# Patient Record
Sex: MALE | Race: WHITE
[De-identification: names, ages, dates, MRNs, and addresses within clinical notes are randomized per-mention and may not be internally consistent; named-entity substitution may affect disease eponyms.]

---

## 2017-01-05 ENCOUNTER — HOSPITAL ENCOUNTER (OUTPATIENT)
Dept: HOSPITAL 45 - C.CTS | Age: 62
Discharge: HOME | End: 2017-01-05
Attending: INTERNAL MEDICINE
Payer: COMMERCIAL

## 2017-01-05 DIAGNOSIS — R06.09: ICD-10-CM

## 2017-01-05 DIAGNOSIS — R91.1: ICD-10-CM

## 2017-01-05 DIAGNOSIS — R91.8: ICD-10-CM

## 2017-01-05 DIAGNOSIS — J45.909: Primary | ICD-10-CM

## 2017-01-05 DIAGNOSIS — R06.02: ICD-10-CM

## 2017-01-05 NOTE — DIAGNOSTIC IMAGING REPORT
CHEST CT WITHOUT CONTRAST



CT DOSE: 513.18 mGy.cm



HISTORY: Lung nodule  J45.909 CnkikxG39.09 Dyspnea on [id number] Lung nodule,

multi



TECHNIQUE: Multiaxial CT images of the chest were performed without contrast.



COMPARISON: 7/20/2016



FINDINGS: Improved exam. The potential polypoid lesion within the trachea is no

longer present and presumably represented mucus on the prior exam. No

significant mediastinal or hilar adenopathy.



Lungs remain clear. Micronodularity right upper lung is unchanged. There are no

new interval or progressive nodules.



IMPRESSION: 



1. Stable to improved exam.





2. Unchanging micronodule right upper lung.





3. Study is otherwise negative.





4. The potential polypoid lesion on the prior study is not present currently and

most likely represented retained mucus on the prior study 







Electronically signed by:  Mahendra Hercules M.D.

1/5/2017 10:39 AM

## 2017-04-04 ENCOUNTER — HOSPITAL ENCOUNTER (OUTPATIENT)
Dept: HOSPITAL 45 - C.LAB1850 | Age: 62
Discharge: HOME | End: 2017-04-04
Attending: INTERNAL MEDICINE
Payer: COMMERCIAL

## 2017-04-04 DIAGNOSIS — D64.9: ICD-10-CM

## 2017-04-04 DIAGNOSIS — N40.1: ICD-10-CM

## 2017-04-04 DIAGNOSIS — E78.5: Primary | ICD-10-CM

## 2017-04-04 DIAGNOSIS — I10: ICD-10-CM

## 2017-04-04 LAB
ALT SERPL-CCNC: 26 U/L (ref 12–78)
ANION GAP SERPL CALC-SCNC: 6 MMOL/L (ref 3–11)
AST SERPL-CCNC: 23 U/L (ref 15–37)
BASOPHILS # BLD: 0.05 K/UL (ref 0–0.2)
BASOPHILS NFR BLD: 1 %
BUN SERPL-MCNC: 15 MG/DL (ref 7–18)
BUN/CREAT SERPL: 17 (ref 10–20)
CALCIUM SERPL-MCNC: 9.1 MG/DL (ref 8.5–10.1)
CHLORIDE SERPL-SCNC: 105 MMOL/L (ref 98–107)
CHOLEST/HDLC SERPL: 2.8 {RATIO}
CO2 SERPL-SCNC: 29 MMOL/L (ref 21–32)
COMPLETE: YES
CREAT SERPL-MCNC: 0.89 MG/DL (ref 0.6–1.4)
EOSINOPHIL NFR BLD AUTO: 251 K/UL (ref 130–400)
GLUCOSE SERPL-MCNC: 94 MG/DL (ref 70–99)
GLUCOSE UR QL: 63 MG/DL
HCT VFR BLD CALC: 40.2 % (ref 42–52)
IG%: 0.2 %
IMM GRANULOCYTES NFR BLD AUTO: 27.4 %
KETONES UR QL STRIP: 99 MG/DL
LYMPHOCYTES # BLD: 1.37 K/UL (ref 1.2–3.4)
MCH RBC QN AUTO: 28.9 PG (ref 25–34)
MCHC RBC AUTO-ENTMCNC: 32.6 G/DL (ref 32–36)
MCV RBC AUTO: 88.5 FL (ref 80–100)
MONOCYTES NFR BLD: 7 %
NEUTROPHILS # BLD AUTO: 1.8 %
NEUTROPHILS NFR BLD AUTO: 62.6 %
NITRITE UR QL STRIP: 75 MG/DL (ref 0–150)
PH UR: 177 MG/DL (ref 0–200)
PMV BLD AUTO: 10.3 FL (ref 7.4–10.4)
POTASSIUM SERPL-SCNC: 4 MMOL/L (ref 3.5–5.1)
PSA SERPL-MCNC: 2.09 NG/ML (ref 0–4)
RBC # BLD AUTO: 4.54 M/UL (ref 4.7–6.1)
SODIUM SERPL-SCNC: 140 MMOL/L (ref 136–145)
VERY LOW DENSITY LIPOPROT CALC: 15 MG/DL
WBC # BLD AUTO: 5 K/UL (ref 4.8–10.8)

## 2017-10-10 ENCOUNTER — HOSPITAL ENCOUNTER (OUTPATIENT)
Dept: HOSPITAL 45 - C.LAB1850 | Age: 62
Discharge: HOME | End: 2017-10-10
Attending: INTERNAL MEDICINE
Payer: COMMERCIAL

## 2017-10-10 DIAGNOSIS — E78.5: Primary | ICD-10-CM

## 2017-10-10 DIAGNOSIS — D64.9: ICD-10-CM

## 2017-10-10 DIAGNOSIS — M10.9: ICD-10-CM

## 2017-10-10 LAB
ALT SERPL-CCNC: 30 U/L (ref 12–78)
ANION GAP SERPL CALC-SCNC: 4 MMOL/L (ref 3–11)
AST SERPL-CCNC: 16 U/L (ref 15–37)
BASOPHILS # BLD: 0.03 K/UL (ref 0–0.2)
BASOPHILS NFR BLD: 0.6 %
BUN SERPL-MCNC: 18 MG/DL (ref 7–18)
BUN/CREAT SERPL: 19.3 (ref 10–20)
CALCIUM SERPL-MCNC: 9.1 MG/DL (ref 8.5–10.1)
CHLORIDE SERPL-SCNC: 107 MMOL/L (ref 98–107)
CHOLEST/HDLC SERPL: 2.9 {RATIO}
CO2 SERPL-SCNC: 29 MMOL/L (ref 21–32)
COMPLETE: YES
CREAT SERPL-MCNC: 0.91 MG/DL (ref 0.6–1.4)
EOSINOPHIL NFR BLD AUTO: 258 K/UL (ref 130–400)
FERRITIN SERPL-MCNC: 34.1 NG/ML (ref 8–388)
GLUCOSE SERPL-MCNC: 94 MG/DL (ref 70–99)
GLUCOSE UR QL: 66 MG/DL
HCT VFR BLD CALC: 38.4 % (ref 42–52)
IG%: 0.2 %
IMM GRANULOCYTES NFR BLD AUTO: 28.2 %
KETONES UR QL STRIP: 108 MG/DL
LYMPHOCYTES # BLD: 1.37 K/UL (ref 1.2–3.4)
MCH RBC QN AUTO: 28.7 PG (ref 25–34)
MCHC RBC AUTO-ENTMCNC: 33.1 G/DL (ref 32–36)
MCV RBC AUTO: 86.9 FL (ref 80–100)
MONOCYTES NFR BLD: 9.1 %
NEUTROPHILS # BLD AUTO: 1.6 %
NEUTROPHILS NFR BLD AUTO: 60.3 %
NITRITE UR QL STRIP: 84 MG/DL (ref 0–150)
PH UR: 191 MG/DL (ref 0–200)
PMV BLD AUTO: 10.3 FL (ref 7.4–10.4)
POTASSIUM SERPL-SCNC: 3.9 MMOL/L (ref 3.5–5.1)
RBC # BLD AUTO: 4.42 M/UL (ref 4.7–6.1)
SODIUM SERPL-SCNC: 140 MMOL/L (ref 136–145)
URATE SERPL-MCNC: 6.2 MG/DL (ref 2.6–7.2)
VERY LOW DENSITY LIPOPROT CALC: 17 MG/DL
WBC # BLD AUTO: 4.86 K/UL (ref 4.8–10.8)

## 2018-03-05 ENCOUNTER — HOSPITAL ENCOUNTER (OUTPATIENT)
Dept: HOSPITAL 45 - C.LABBC | Age: 63
Discharge: HOME | End: 2018-03-05
Attending: INTERNAL MEDICINE
Payer: COMMERCIAL

## 2018-03-05 DIAGNOSIS — I10: ICD-10-CM

## 2018-03-05 DIAGNOSIS — M10.9: ICD-10-CM

## 2018-03-05 DIAGNOSIS — E78.5: Primary | ICD-10-CM

## 2018-03-05 DIAGNOSIS — N40.1: ICD-10-CM

## 2018-03-05 LAB
ALT SERPL-CCNC: 23 U/L (ref 12–78)
AST SERPL-CCNC: 13 U/L (ref 15–37)
BUN SERPL-MCNC: 17 MG/DL (ref 7–18)
CALCIUM SERPL-MCNC: 9.2 MG/DL (ref 8.5–10.1)
CO2 SERPL-SCNC: 28 MMOL/L (ref 21–32)
CREAT SERPL-MCNC: 0.96 MG/DL (ref 0.6–1.4)
EOSINOPHIL NFR BLD AUTO: 267 K/UL (ref 130–400)
GLUCOSE SERPL-MCNC: 113 MG/DL (ref 70–99)
HCT VFR BLD CALC: 40.5 % (ref 42–52)
HGB BLD-MCNC: 13.4 G/DL (ref 14–18)
KETONES UR QL STRIP: 96 MG/DL
MCH RBC QN AUTO: 28.8 PG (ref 25–34)
MCHC RBC AUTO-ENTMCNC: 33.1 G/DL (ref 32–36)
MCV RBC AUTO: 86.9 FL (ref 80–100)
PH UR: 172 MG/DL (ref 0–200)
PMV BLD AUTO: 10.1 FL (ref 7.4–10.4)
POTASSIUM SERPL-SCNC: 3.9 MMOL/L (ref 3.5–5.1)
RED CELL DISTRIBUTION WIDTH CV: 14.5 % (ref 11.5–14.5)
RED CELL DISTRIBUTION WIDTH SD: 45.9 FL (ref 36.4–46.3)
SODIUM SERPL-SCNC: 138 MMOL/L (ref 136–145)
WBC # BLD AUTO: 5.69 K/UL (ref 4.8–10.8)

## 2018-07-23 ENCOUNTER — HOSPITAL ENCOUNTER (OUTPATIENT)
Dept: HOSPITAL 45 - C.LAB1850 | Age: 63
Discharge: HOME | End: 2018-07-23
Attending: INTERNAL MEDICINE
Payer: COMMERCIAL

## 2018-07-23 DIAGNOSIS — R73.09: ICD-10-CM

## 2018-07-23 DIAGNOSIS — R97.20: Primary | ICD-10-CM

## 2018-07-23 LAB — HBA1C MFR BLD: 6 % (ref 4.5–5.6)

## 2018-08-03 ENCOUNTER — HOSPITAL ENCOUNTER (OUTPATIENT)
Dept: HOSPITAL 45 - C.CTS | Age: 63
Discharge: HOME | End: 2018-08-03
Attending: INTERNAL MEDICINE
Payer: COMMERCIAL

## 2018-08-03 DIAGNOSIS — R91.8: Primary | ICD-10-CM

## 2018-08-03 NOTE — DIAGNOSTIC IMAGING REPORT
(CHEST) THORAX WITHOUT



CT DOSE: 666.62 mGycm



CLINICAL HISTORY: 62 years-old Male with R91.8 Lung nodule, multipleto be done

in July 2018 prior to appo.  Follow-up study in a patient with history of

pulmonary nodules  



TECHNIQUE: Multiaxial CT images of the chest were performed without contrast.  A

dose lowering technique was utilized adhering to the principles of ALARA.



COMPARISON: CT chest 1/5/2017 and 7/29/2016.



FINDINGS: 

 1.0 cm hypodense left thyroid nodule. No pathologically enlarged lymph nodes of

the chest. 3 mm epicardial nodule on image 260 series 4 suggests epicardial

lymph node. Heart is normal in size. No thoracic aortic aneurysm.



There is no pneumothorax or pleural effusion. Central airways are patent. 5 mm

pleural-based nodule adjacent to the right lower lobe on image 197 series 4 is

unchanged. Unchanged 4 mm solid nodule of the apical segment right upper lobe on

image 96 series 4. 4 mm nodule abutting the superior aspect of the right major

fissure on image 122 series 4 is unchanged. There are no new or enlarging

pulmonary nodules identified. No focal airspace consolidation to suggest

pneumonia. 2 mm solid nodule of the left upper lobe, image 135 series 4 is

unchanged.



Unchanged 1.7 cm hypodense lesion of the subserosal posterior right hepatic lobe

suggesting probable hepatic cyst. Mild nonspecific bilateral perinephric

stranding. Soft tissues are within normal limits. The bones appear to be intact.

Suggested rotator cuff calcific tendinosis about the bilateral shoulders.

Multilevel spondylitic spurring of the spine.



IMPRESSION: 



1. No acute intrathoracic abnormality identified.

2. Bilateral solid pulmonary nodules measuring up to 5 mm appear unchanged

dating back to 7/29/2016. No new or enlarging nodules identified. Based on the

guidelines below, no additional follow-up is recommended according to the

Fleischner Society.

3. No focal airspace consolidation or pathologically enlarged lymph nodes.



Please refer to below summary of Fleischner criteria recommendations for

follow-up of incidental CT nodules (AMY Jackson, Guidelines for management of

small pulmonary nodules detected on CT scans:  A statement from the Fleischner

Society, Radiology 237: [phone].)



SOLID NODULES



Multiple nodules size:  <6 mm

*  Low risk patients:  no routine follow-up

*  high risk patients:  optional CT at 12 months



Note:  newly detected indeterminate nodule in persons 35 years of age or older.

*  Low risk patients:  minimal or absent history of smoking and/or other known

risk factors

*  high risk patients:  history of smoking or of other known risk factors (e.g.

first degree relative with lung cancer, or exposure to asbestos, radon, uranium)

*  if a nodule up to 8 mm is partly solid or is ground glass further follow-up

is required after 24 months to exclude possible slow growing adenocarcinoma

(KINGS)



The above report was generated using voice recognition software. It may contain

grammatical, syntax or spelling errors.



       







Electronically signed by:  Ollie Kraft M.D.

8/3/2018 7:32 AM



Dictated Date/Time:  8/3/2018 7:13 AM